# Patient Record
Sex: FEMALE | Race: AMERICAN INDIAN OR ALASKA NATIVE
[De-identification: names, ages, dates, MRNs, and addresses within clinical notes are randomized per-mention and may not be internally consistent; named-entity substitution may affect disease eponyms.]

---

## 2022-01-06 ENCOUNTER — HOSPITAL ENCOUNTER (EMERGENCY)
Dept: HOSPITAL 5 - ED | Age: 32
LOS: 1 days | Discharge: HOME | End: 2022-01-07
Payer: COMMERCIAL

## 2022-01-06 DIAGNOSIS — Z3A.01: ICD-10-CM

## 2022-01-06 DIAGNOSIS — O20.0: Primary | ICD-10-CM

## 2022-01-06 LAB
BACTERIA #/AREA URNS HPF: (no result) /HPF
BASOPHILS # (AUTO): 0.1 K/MM3 (ref 0–0.1)
BASOPHILS NFR BLD AUTO: 1 % (ref 0–1.8)
BILIRUB UR QL STRIP: (no result)
BLOOD UR QL VISUAL: (no result)
BUN SERPL-MCNC: 11 MG/DL (ref 7–17)
BUN/CREAT SERPL: 22 %
CALCIUM SERPL-MCNC: 8.7 MG/DL (ref 8.4–10.2)
EOSINOPHIL # BLD AUTO: 0.1 K/MM3 (ref 0–0.4)
EOSINOPHIL NFR BLD AUTO: 1.3 % (ref 0–4.3)
HCT VFR BLD CALC: 31.6 % (ref 30.3–42.9)
HEMOLYSIS INDEX: 6
HGB BLD-MCNC: 9.4 GM/DL (ref 10.1–14.3)
LYMPHOCYTES # BLD AUTO: 1.1 K/MM3 (ref 1.2–5.4)
LYMPHOCYTES NFR BLD AUTO: 18.1 % (ref 13.4–35)
MCHC RBC AUTO-ENTMCNC: 30 % (ref 30–34)
MCV RBC AUTO: 72 FL (ref 79–97)
MONOCYTES # (AUTO): 0.6 K/MM3 (ref 0–0.8)
MONOCYTES % (AUTO): 9.4 % (ref 0–7.3)
MUCOUS THREADS #/AREA URNS HPF: (no result) /HPF
PH UR STRIP: 6 [PH] (ref 5–7)
PLATELET # BLD: 275 K/MM3 (ref 140–440)
PROT UR STRIP-MCNC: (no result) MG/DL
RBC # BLD AUTO: 4.4 M/MM3 (ref 3.65–5.03)
RBC #/AREA URNS HPF: < 1 /HPF (ref 0–6)
UROBILINOGEN UR-MCNC: 2 MG/DL (ref ?–2)
WBC #/AREA URNS HPF: < 1 /HPF (ref 0–6)

## 2022-01-06 PROCEDURE — 76801 OB US < 14 WKS SINGLE FETUS: CPT

## 2022-01-06 PROCEDURE — 36415 COLL VENOUS BLD VENIPUNCTURE: CPT

## 2022-01-06 PROCEDURE — 86900 BLOOD TYPING SEROLOGIC ABO: CPT

## 2022-01-06 PROCEDURE — 80048 BASIC METABOLIC PNL TOTAL CA: CPT

## 2022-01-06 PROCEDURE — 85025 COMPLETE CBC W/AUTO DIFF WBC: CPT

## 2022-01-06 PROCEDURE — 99284 EMERGENCY DEPT VISIT MOD MDM: CPT

## 2022-01-06 PROCEDURE — 86901 BLOOD TYPING SEROLOGIC RH(D): CPT

## 2022-01-06 PROCEDURE — 81001 URINALYSIS AUTO W/SCOPE: CPT

## 2022-01-06 PROCEDURE — 84702 CHORIONIC GONADOTROPIN TEST: CPT

## 2022-01-06 PROCEDURE — 76817 TRANSVAGINAL US OBSTETRIC: CPT

## 2022-01-06 NOTE — EMERGENCY DEPARTMENT REPORT
HPI





- General


Chief Complaint: Vaginal Bleeding


Time Seen by Provider: 22 22:23





- HPI


HPI: 





31-year-old -American female presents to the emergency department with a 

complaint of "I think I am having a miscarriage."  The patient says that she has

not had a menstrual cycle since towards the end of October.  She also recently 

took a home pregnancy test that resulted as positive.  Today the patient began 

having some mild vaginal bleeding that she describes as the amount you would get

"at the end of a period."  Also the patient had been having some recent breast 

tenderness and she says that it has gone back to normal.  With this pregnancy t

he patient would be G4, P1 with 2 previous .  She has a past medical 

history of neurofibromatosis type I.  She has not taken anything for symptoms 

prior to presentation today.  She denies any abdominal or pelvic pain, but does 

say that her lower back feels tight.





ED Past Medical Hx





- Past Medical History


Previous Medical History?: No





- Surgical History


Past Surgical History?: No





ED Review of Systems


ROS: 


Stated complaint: PREGNANT AND BLEEDING


Other details as noted in HPI





Comment: All other systems reviewed and negative


Constitutional: denies: chills, fever


Respiratory: denies: cough, shortness of breath


Cardiovascular: denies: chest pain, palpitations


Gastrointestinal: denies: abdominal pain, vomiting


Genitourinary: other (Vaginal bleeding).  denies: dysuria, discharge


Musculoskeletal: back pain.  denies: arthralgia


Skin: denies: rash, lesions


Neurological: denies: headache, weakness





Physical Exam





- Physical Exam


Vital Signs: 


                                   Vital Signs











  22





  21:31


 


Temperature 98.2 F


 


Pulse Rate 69


 


Respiratory 18





Rate 


 


Blood Pressure 117/31


 


O2 Sat by Pulse 100





Oximetry 











Physical Exam: 





GENERAL: The patient is well-developed well-nourished.


HENT: Normocephalic.  Atraumatic.    Patient has moist mucous membranes.


EYES: Extraocular motions are intact.


NECK: Supple. Trachea is midline.


CHEST/LUNGS: Clear to auscultation.  There is no respiratory distress noted.


HEART/CARDIOVASCULAR: Regular.  There is no tachycardia.  There is no murmur.


ABDOMEN: Abdomen is soft, nontender.  Patient has normal bowel sounds.  There is

no abdominal distention.


SKIN: Skin is warm and dry. 


NEURO: The patient is awake, alert, and oriented.  The patient is cooperative.  

The patient has no focal neurologic deficits.  Normal speech.


MUSCULOSKELETAL: There is no tenderness or deformity.





ED Course


                                   Vital Signs











  22





  21:31


 


Temperature 98.2 F


 


Pulse Rate 69


 


Respiratory 18





Rate 


 


Blood Pressure 117/31


 


O2 Sat by Pulse 100





Oximetry 














ED Medical Decision Making





- Lab Data


Result diagrams: 


                                 22 21:46





                                 22 21:46





                                   Lab Results











  22 Range/Units





  21:46 21:46 21:46 


 


WBC  6.2    (4.5-11.0)  K/mm3


 


RBC  4.40    (3.65-5.03)  M/mm3


 


Hgb  9.4 L    (10.1-14.3)  gm/dl


 


Hct  31.6    (30.3-42.9)  %


 


MCV  72 L    (79-97)  fl


 


MCH  22 L    (28-32)  pg


 


MCHC  30    (30-34)  %


 


RDW  19.0 H    (13.2-15.2)  %


 


Plt Count  275    (140-440)  K/mm3


 


Lymph % (Auto)  18.1    (13.4-35.0)  %


 


Mono % (Auto)  9.4 H    (0.0-7.3)  %


 


Eos % (Auto)  1.3    (0.0-4.3)  %


 


Baso % (Auto)  1.0    (0.0-1.8)  %


 


Lymph # (Auto)  1.1 L    (1.2-5.4)  K/mm3


 


Mono # (Auto)  0.6    (0.0-0.8)  K/mm3


 


Eos # (Auto)  0.1    (0.0-0.4)  K/mm3


 


Baso # (Auto)  0.1    (0.0-0.1)  K/mm3


 


Seg Neutrophils %  70.2 H    (40.0-70.0)  %


 


Seg Neutrophils #  4.3    (1.8-7.7)  K/mm3


 


Sodium     (137-145)  mmol/L


 


Potassium     (3.6-5.0)  mmol/L


 


Chloride     ()  mmol/L


 


Carbon Dioxide     (22-30)  mmol/L


 


Anion Gap     mmol/L


 


BUN     (7-17)  mg/dL


 


Creatinine     (0.6-1.2)  mg/dL


 


Estimated GFR     ml/min


 


BUN/Creatinine Ratio     %


 


Glucose     ()  mg/dL


 


Calcium     (8.4-10.2)  mg/dL


 


HCG, Quant   13422 H   (0-4)  mIU/mL


 


Urine Color     (Yellow)  


 


Urine Turbidity     (Clear)  


 


Urine pH     (5.0-7.0)  


 


Ur Specific Gravity     (1.003-1.030)  


 


Urine Protein     (Negative)  mg/dL


 


Urine Glucose (UA)     (Negative)  mg/dL


 


Urine Ketones     (Negative)  mg/dL


 


Urine Blood     (Negative)  


 


Urine Nitrite     (Negative)  


 


Urine Bilirubin     (Negative)  


 


Urine Urobilinogen     (<2.0)  mg/dL


 


Ur Leukocyte Esterase     (Negative)  


 


Urine WBC (Auto)     (0.0-6.0)  /HPF


 


Urine RBC (Auto)     (0.0-6.0)  /HPF


 


U Epithel Cells (Auto)     (0-13.0)  /HPF


 


Urine Bacteria (Auto)     (Negative)  /HPF


 


Urine Mucus     /HPF


 


Blood Type    A POSITIVE  














  22 Range/Units





  21:46 Unknown 


 


WBC    (4.5-11.0)  K/mm3


 


RBC    (3.65-5.03)  M/mm3


 


Hgb    (10.1-14.3)  gm/dl


 


Hct    (30.3-42.9)  %


 


MCV    (79-97)  fl


 


MCH    (28-32)  pg


 


MCHC    (30-34)  %


 


RDW    (13.2-15.2)  %


 


Plt Count    (140-440)  K/mm3


 


Lymph % (Auto)    (13.4-35.0)  %


 


Mono % (Auto)    (0.0-7.3)  %


 


Eos % (Auto)    (0.0-4.3)  %


 


Baso % (Auto)    (0.0-1.8)  %


 


Lymph # (Auto)    (1.2-5.4)  K/mm3


 


Mono # (Auto)    (0.0-0.8)  K/mm3


 


Eos # (Auto)    (0.0-0.4)  K/mm3


 


Baso # (Auto)    (0.0-0.1)  K/mm3


 


Seg Neutrophils %    (40.0-70.0)  %


 


Seg Neutrophils #    (1.8-7.7)  K/mm3


 


Sodium  139   (137-145)  mmol/L


 


Potassium  3.8   (3.6-5.0)  mmol/L


 


Chloride  105.2   ()  mmol/L


 


Carbon Dioxide  23   (22-30)  mmol/L


 


Anion Gap  15   mmol/L


 


BUN  11   (7-17)  mg/dL


 


Creatinine  0.5 L   (0.6-1.2)  mg/dL


 


Estimated GFR  > 60   ml/min


 


BUN/Creatinine Ratio  22   %


 


Glucose  107 H   ()  mg/dL


 


Calcium  8.7   (8.4-10.2)  mg/dL


 


HCG, Quant    (0-4)  mIU/mL


 


Urine Color   Yellow  (Yellow)  


 


Urine Turbidity   Hazy  (Clear)  


 


Urine pH   6.0  (5.0-7.0)  


 


Ur Specific Gravity   1.021  (1.003-1.030)  


 


Urine Protein   <15 mg/dl  (Negative)  mg/dL


 


Urine Glucose (UA)   Neg  (Negative)  mg/dL


 


Urine Ketones   Neg  (Negative)  mg/dL


 


Urine Blood   Mod  (Negative)  


 


Urine Nitrite   Neg  (Negative)  


 


Urine Bilirubin   Neg  (Negative)  


 


Urine Urobilinogen   2.0  (<2.0)  mg/dL


 


Ur Leukocyte Esterase   Neg  (Negative)  


 


Urine WBC (Auto)   < 1.0  (0.0-6.0)  /HPF


 


Urine RBC (Auto)   < 1.0  (0.0-6.0)  /HPF


 


U Epithel Cells (Auto)   10.0  (0-13.0)  /HPF


 


Urine Bacteria (Auto)   1+  (Negative)  /HPF


 


Urine Mucus   1+  /HPF


 


Blood Type    














- Radiology Data


Radiology results: report reviewed





ULTRASOUND OBSTETRIC , 1ST TRIMESTER INDICATION / CLINICAL INFORMATION: 

pregnant, vaginal bleeding. Clinical Gestational Age (GA) in weeks, days: 

Approximately 9 weeks TECHNIQUE: Transabdominal. Endovaginal COMPARISON: None 

available. FINDINGS: GESTATIONAL SAC: Well-defined oval shape and intrauterine 

in location. Estimated gestational age is 6 weeks 6 days YOLK SAC: No 

significant abnormality. There is no fetal pole identified within the 

gestational sac at this time. ADNEXA: 1.2 cm right ovarian complicated cyst. 

Left ovary is normal in appearance. FREE FLUID: None. ADDITIONAL FINDINGS: None.

IMPRESSION: 1. Single intrauterine gestational sac with estimated sonographic 

age of 6 weeks, 6 days. No evidence of fetal pole at this time. Please correlate

with hCG levels. 





- Medical Decision Making





This patient presented to the emergency department for evaluation as she was 

concerned that she may be having a miscarriage.  She had a positive home 

pregnancy test.  Her last menstrual cycle was at the end of October.  She 

started having some vaginal bleeding without any abdominal or pelvic pain.





Patient's labs shows some mild anemia with hemoglobin of about 9, but this is 

not a level that requires a transfusion.  Her beta hCG is about 14,000.  The 

patient's blood type is a positive and therefore does not require the ED RhoGAM 

shot.





Fetal ultrasound shows a gestational and yolk sac within the uterus, but no 

evidence of a fetal pole.  She has had a gestational age of about 6 weeks and 6 

days.  With her beta hCG level and this gestational age, I would have thought 

that there would be a fetal pole present.  The differential includes early 

pregnancy versus threatened miscarriage versus incomplete miscarriage.  The 

patient and I had a long discussion regarding the lab and imaging results and 

outpatient follow-up.  The patient will follow-up with an OB/GYN, or if 

necessary return to the emergency department, in about 4 or 5 days for a repeat 

beta-hCG and possibly ultrasound.  If the hormone level is increasing, a repeat 

ultrasound may show formation of the fetal pole.  If the hormone level is 

decreasing, she may see degradation of the yolk sac and/or gestational sac 

indicating a miscarriage.





Vital signs reassuring including being afebrile.


Critical Care Time: No


Critical care attestation.: 


If time is entered above; I have spent that time in minutes in the direct care 

of this critically ill patient, excluding procedure time.








ED Disposition


Clinical Impression: 


 Threatened miscarriage





Disposition: 01 HOME / SELF CARE / HOMELESS


Is pt being admited?: No


Condition: Stable


Instructions:  Threatened Miscarriage


Additional Instructions: 


Your ultrasound today showed the formation of a gestational sac and a yolk sac, 

but they have not identified a fetal pole (the fetus).  The ultrasound says that

you are 6 weeks and 6 days gestation.  Your pregnancy hormone, beta hCG, was 

about 14,000.





Please follow-up with an OB/GYN in about 3 to 4 days.  You will need a repeat 

beta-hCG and most likely a repeat ultrasound.  If the hormone level is 

increasing, this might be an early pregnancy and hopefully there would be 

formation of the fetal pole at that time.  If the hormone level is decreasing, 

then this may represent a miscarriage.





Return to the emergency department with any worsening of your symptoms, new or 

concerning symptoms not addressed during this current emergency department 

visit, or with any acute distress.


Referrals: 


[facility] [Primary Care Provider] - 3-5 Days


[facility] [Provider Group] - 3-5 Days


[facility] [Provider Group] - 3-5 Days


[facility] [Provider Group] - 3-5 Days


Forms:  Work/School Release Form(ED)


Time of Disposition: 02:02

## 2022-01-07 VITALS — SYSTOLIC BLOOD PRESSURE: 118 MMHG | DIASTOLIC BLOOD PRESSURE: 62 MMHG

## 2022-01-07 NOTE — ULTRASOUND REPORT
ULTRASOUND OBSTETRIC , 1ST TRIMESTER



INDICATION / CLINICAL INFORMATION: pregnant, vaginal bleeding.

Clinical Gestational Age (GA) in weeks, days: Approximately 9 weeks 



TECHNIQUE: Transabdominal. Endovaginal  



COMPARISON: None available.



FINDINGS:

GESTATIONAL SAC: Well-defined oval shape and intrauterine in location. Estimated gestational age is 6
 weeks 6 days

YOLK SAC: No significant abnormality.

There is no fetal pole identified within the gestational sac at this time.



ADNEXA: 1.2 cm right ovarian complicated cyst. Left ovary is normal in appearance.

FREE FLUID: None.



ADDITIONAL FINDINGS: None.



IMPRESSION:

1. Single intrauterine gestational sac with estimated sonographic age of 6 weeks, 6 days.  No evidenc
e of fetal pole at this time. Please correlate with hCG levels.



Signer Name: Jovana Jefferson MD 

Signed: 1/7/2022 1:42 AM

Workstation Name: [id number]

## 2022-01-17 ENCOUNTER — HOSPITAL ENCOUNTER (EMERGENCY)
Dept: HOSPITAL 5 - ED | Age: 32
LOS: 1 days | Discharge: HOME | End: 2022-01-18
Payer: COMMERCIAL

## 2022-01-17 VITALS — SYSTOLIC BLOOD PRESSURE: 107 MMHG | DIASTOLIC BLOOD PRESSURE: 39 MMHG

## 2022-01-17 DIAGNOSIS — O03.1: Primary | ICD-10-CM

## 2022-01-17 LAB
BASOPHILS # (AUTO): 0 K/MM3 (ref 0–0.1)
BASOPHILS NFR BLD AUTO: 0.8 % (ref 0–1.8)
BUN SERPL-MCNC: 12 MG/DL (ref 7–17)
BUN/CREAT SERPL: 24 %
CALCIUM SERPL-MCNC: 8.4 MG/DL (ref 8.4–10.2)
EOSINOPHIL # BLD AUTO: 0.1 K/MM3 (ref 0–0.4)
EOSINOPHIL NFR BLD AUTO: 1.4 % (ref 0–4.3)
HCT VFR BLD CALC: 27.6 % (ref 30.3–42.9)
HEMOLYSIS INDEX: 13
HGB BLD-MCNC: 8.2 GM/DL (ref 10.1–14.3)
LYMPHOCYTES # BLD AUTO: 1 K/MM3 (ref 1.2–5.4)
LYMPHOCYTES NFR BLD AUTO: 18.9 % (ref 13.4–35)
MCHC RBC AUTO-ENTMCNC: 30 % (ref 30–34)
MCV RBC AUTO: 72 FL (ref 79–97)
MONOCYTES # (AUTO): 0.6 K/MM3 (ref 0–0.8)
MONOCYTES % (AUTO): 11.3 % (ref 0–7.3)
PLATELET # BLD: 247 K/MM3 (ref 140–440)
RBC # BLD AUTO: 3.83 M/MM3 (ref 3.65–5.03)

## 2022-01-17 PROCEDURE — 81001 URINALYSIS AUTO W/SCOPE: CPT

## 2022-01-17 PROCEDURE — 84702 CHORIONIC GONADOTROPIN TEST: CPT

## 2022-01-17 PROCEDURE — 76801 OB US < 14 WKS SINGLE FETUS: CPT

## 2022-01-17 PROCEDURE — 99284 EMERGENCY DEPT VISIT MOD MDM: CPT

## 2022-01-17 PROCEDURE — 85025 COMPLETE CBC W/AUTO DIFF WBC: CPT

## 2022-01-17 PROCEDURE — 80048 BASIC METABOLIC PNL TOTAL CA: CPT

## 2022-01-17 PROCEDURE — 36415 COLL VENOUS BLD VENIPUNCTURE: CPT

## 2022-01-17 NOTE — ULTRASOUND REPORT
ULTRASOUND OBSTETRIC 



INDICATION / CLINICAL INFORMATION: bleeding. Beta hCG 2169

Clinical Gestational Age (GA) in weeks, days: 10 weeks 6 days 



TECHNIQUE: Transabdominal.



COMPARISON: Ultrasound 2022.



FINDINGS:

No intrauterine living fetus identified. There is a heterogeneous collection in the lower uterine seg
ment/cervix measuring 4.3 x 5.8 x 3.6 cm containing questionable gestational sac.



The endometrial echo complex measures 0.6 cm.



Right ovary measures 4.7 x 2.4 x 2.2 cm and contains small follicles.



The left ovary measures 2.1 x 1.7 x 2.7 cm and contains a small cyst/follicle measuring 1.3 x 1.0 x 1
.4 cm.



FREE FLUID: None.



ADDITIONAL FINDINGS: None.



IMPRESSION:

1. No definite living intrauterine pregnancy identified. There is a heterogeneous collection in the l
ower uterine segment/cervix which contains a possible gestational sac. These findings are concerning 
for  in progress. Recommend clinical correlation and OB/GYN consultation as indicated.





Signer Name: Kobe Landin MD 

Signed: 2022 10:16 PM

Workstation Name: [id number]

## 2022-01-18 LAB
BILIRUB UR QL STRIP: (no result)
BLOOD UR QL VISUAL: (no result)
MUCOUS THREADS #/AREA URNS HPF: (no result) /HPF
PH UR STRIP: 5 [PH] (ref 5–7)
RBC #/AREA URNS HPF: 2 /HPF (ref 0–6)
UROBILINOGEN UR-MCNC: < 2 MG/DL (ref ?–2)
WBC #/AREA URNS HPF: 6 /HPF (ref 0–6)

## 2022-01-18 NOTE — EMERGENCY DEPARTMENT REPORT
ED Female  HPI





- General


Chief complaint: Vaginal Bleeding


Stated complaint: MISCARRIAGE


Source: patient


Mode of arrival: Ambulatory


Limitations: No Limitations





- History of Present Illness


Initial comments: 





Patient is a A2 31-year-old -American female with no past medical 

history presented to the ED with complaint of persistent suprapubic pain and 

heavy vaginal bleeding for the last 2 weeks.  Patient states that she has been 

evaluated in this ED previously for the same and was told that she was having a 

miscarriage.  Patient states that the bleeding has been persistently heavy and 

that the pain is worsened especially in the last 2 days.  Patient states that 

the last time she was in the ED she was told that there was no fetal heart tones

per ultrasound that was performed in the ED.  Patient denies fever, chills, 

nausea and vomiting, diarrhea, dizziness, syncope, chest pain, hemoptysis, 

hematemesis, hematochezia, diarrhea, vaginal discharge, dysuria, urinary 

frequency and urgency or cough, chest pain or shortness of breath.


MD Complaint: vaginal bleeding, pelvic pain


-: Sudden, week(s) (2)


Location: suprapubic, other (vaginal)


Radiation: non-radiating


Severity: severe


Severity scale (0 -10): 7


Quality: cramping, sharp


Consistency: constant


Improves with: none


Worsens with: none


Are you Pregnant Now?: Yes (ongoing miscarriage)


Associated Symptoms: denies other symptoms, vaginal bleeding, abdominal pain.  

denies: vaginal discharge, nausea/vomiting, fever/chills, loss of appetite, 

dysuria, hematuria, rash, shortness of breath, syncope, weakness





- Related Data


Sexually active: Yes


: 4


Para: 1


A: 2


                                    Allergies











Allergy/AdvReac Type Severity Reaction Status Date / Time


 


No Known Allergies Allergy   Unverified 22 21:33














ED Review of Systems


ROS: 


Stated complaint: MISCARRIAGE


Other details as noted in HPI





Constitutional: denies: chills, fever


Eyes: denies: eye pain, eye discharge, vision change


ENT: denies: ear pain, throat pain


Respiratory: denies: cough, shortness of breath, wheezing


Cardiovascular: denies: chest pain, palpitations


Endocrine: no symptoms reported


Gastrointestinal: abdominal pain (suprapubic).  denies: nausea, vomiting, 

diarrhea


Genitourinary: abnormal menses (vaginal bleeding).  denies: urgency, dysuria, 

discharge


Musculoskeletal: denies: back pain, joint swelling, arthralgia


Skin: denies: rash, lesions


Neurological: denies: headache, weakness, paresthesias


Psychiatric: denies: anxiety, depression


Hematological/Lymphatic: denies: easy bleeding, easy bruising





ED Physical Exam





- General


Limitations: No Limitations


General appearance: alert, in no apparent distress





- Head


Head exam: Present: atraumatic, normocephalic, normal inspection





- Eye


Eye exam: Present: normal appearance, PERRL, EOMI


Pupils: Present: normal accommodation





- ENT


ENT exam: Present: normal exam, normal orophraynx, mucous membranes moist, TM's 

normal bilaterally, normal external ear exam





- Neck


Neck exam: Present: normal inspection, full ROM.  Absent: tenderness





- Respiratory


Respiratory exam: Present: normal lung sounds bilaterally.  Absent: respiratory 

distress, wheezes, rales, rhonchi, chest wall tenderness, accessory muscle use, 

decreased breath sounds, prolonged expiratory





- Cardiovascular


Cardiovascular Exam: Present: regular rate, normal rhythm, normal heart sounds. 

Absent: systolic murmur, diastolic murmur, rubs, gallop





- GI/Abdominal


GI/Abdominal exam: Present: soft, tenderness (Palpable mild suprapubic tende

rness), normal bowel sounds.  Absent: guarding, rebound, hyperactive bowel 

sounds, hypoactive bowel sounds, organomegaly





- 


Bi-manual exam: Present: other (pelvic exam deferred)





- Extremities Exam


Extremities exam: Present: normal inspection, full ROM, normal capillary refill





- Back Exam


Back exam: Present: normal inspection, full ROM.  Absent: tenderness, CVA 

tenderness (R), CVA tenderness (L), muscle spasm, paraspinal tenderness





- Neurological Exam


Neurological exam: Present: alert, oriented X3, CN II-XII intact, normal gait, 

reflexes normal





- Psychiatric


Psychiatric exam: Present: normal affect, normal mood





- Skin


Skin exam: Present: warm, dry, intact, normal color.  Absent: rash





ED Course


                                   Vital Signs











  22





  19:10 02:16


 


Temperature 98.3 F 


 


Pulse Rate 77 73


 


Respiratory 18 17





Rate  


 


Blood Pressure 107/39 





[Right]  


 


O2 Sat by Pulse 100 100





Oximetry  














ED Medical Decision Making





- Lab Data


Result diagrams: 


                                 22 20:07





                                 22 20:07





- Radiology Data


Radiology results: report reviewed, image reviewed





[facility]  


                                     [address] [place] [address]  


 


                                         Ultrasound Report   


                                               Signed  


 


Patient: ANETTE PARRISH                                                        

        MR#: [id number]  


[id number]          


: 1990                                                                

Acct:[id number]      


 


Age/Sex: 31 / F                                                                

ADM Date: 22     


 


Loc: ED       


Attending Dr:   


 


 


Ordering Physician: LUIS A COTTER  


Date of Service: 22  


Procedure(s): US OB <= 14 weeks fetus  


Accession Number(s): [id number]  


 


cc: LUIS A COTTER   


 


 


ULTRASOUND OBSTETRIC   


 


 INDICATION / CLINICAL INFORMATION: bleeding. Beta hCG 2169  


 Clinical Gestational Age (GA) in weeks, days: 10 weeks 6 days   


 


 TECHNIQUE: Transabdominal.  


 


 COMPARISON: Ultrasound 2022.  


 


 FINDINGS:  


 No intrauterine living fetus identified. There is a heterogeneous collection in

 the lower uterine 


segment/cervix measuring 4.3 x 5.8 x 3.6 cm containing questionable gestational 

sac.  


 


 The endometrial echo complex measures 0.6 cm.  


 


 Right ovary measures 4.7 x 2.4 x 2.2 cm and contains small follicles.  


 


 The left ovary measures 2.1 x 1.7 x 2.7 cm and contains a small cyst/follicle 

measuring 1.3 x 1.0 x


1.4 cm.  


 


 FREE FLUID: None.  


 


 ADDITIONAL FINDINGS: None.  


 


 IMPRESSION:  


 1. No definite living intrauterine pregnancy identified. There is a 

heterogeneous collection in the


lower uterine segment/cervix which contains a possible gestational sac. These 

findings are 


concerning for  in progress. Recommend clinical correlation and OB/GYN 

consultation as 


indicated.  


 


 


 Signer Name: Kobe Landin MD   


 Signed: 2022 10:16 PM  


 Workstation Name: [id number]   


 


 


Transcribed By: DB  


Dictated By: KOBE LANDIN MD  


Electronically Authenticated By: KOBE LANDIN MD    


Signed Date/Time: 22                                


 


 


 


DD/DT: 22                                                            

  


TD/TT:


Print





- Medical Decision Making





There is is a A2 31-year-old -American female with no past medical hi

story presented to the ED with complaint of persistent suprapubic pain and heavy

 vaginal bleeding for the last 2 weeks.  Patient states that she has been 

evaluated in this ED previously for the same and was told that she was having a 

miscarriage.  Patient states that the bleeding has been persistently heavy and 

that the pain is worsened especially in the last 2 days.  Patient states that 

the last time she was in the ED she was told that there was no fetal heart tones

 per ultrasound that was performed in the ED. in the ED, patient is alert and 

oriented x3 and is not in distress.  Lab test results were reviewed and showed 

hCG quant of 2169 compared to the hCG quant of 2022 which was [id number]. 

 Patient was treated in the ED with Tylenol for pain.  Transvaginal ultrasound 

showed no definite living intrauterine pregnancy identified. There is a 

heterogeneous collection in the lower uterine segment/cervix which contains a 

possible gestational sac. These findings are concerning for  in 

progress. Recommend clinical correlation and OB/GYN consultation as indicated.  

Based on these findings, the patient miscarriages in progress given that the hCG

 quant is significantly reduced.  Patient was therefore advised to continue to 

maintain a complete pelvic rest, drink plenty of fluids, take Tylenol as needed 

for pain and follow-up with OB/GYN physician in 5 to 7 days for evaluation.  

Patient is advised return to the ED immediately if symptoms get worse.





- Differential Diagnosis


Miscarriage; ovarian cyst; subchorionic bleed; UTI; Kidney stones


Critical care attestation.: 


If time is entered above; I have spent that time in minutes in the direct care 

of this critically ill patient, excluding procedure time.








ED Disposition


Clinical Impression: 


 Inevitable complete miscarriage without complication, Abdominal pain during 

pregnancy in first trimester, Vaginal bleeding in pregnant patient after first 

trimester





Disposition: 01 HOME / SELF CARE / HOMELESS


Is pt being admited?: No


Does the pt Need Aspirin: No


Condition: Stable


Instructions:  Miscarriage, Easy-to-Read, Abdominal Pain During Pregnancy, 

Easy-to-Read, Vaginal Delivery,  Loss, Care After


Additional Instructions: 


All lab test results are reviewed and are all nonactionable.  Transvaginal 

ultrasound showed no definite living intrauterine pregnancy identified. There is

 a heterogeneous collection in the lower uterine segment/cervix which contains a

 possible gestational sac. These findings are concerning for  in 

progress. Recommend clinical correlation and OB/GYN consultation as indicated.  

Therefore maintain a complete pelvic rest, take Tylenol as needed for pain and 

follow-up with your OB/GYN physician in 3 to 5 days for reevaluation.  Return to

 the ED immediately if your symptoms get worse.


 


Referrals: 


LLUVIA SANTOS MD [Staff Physician] - 3-5 Days


Time of Disposition: 02:06


Print Language: ENGLISH